# Patient Record
Sex: MALE | Race: WHITE | Employment: FULL TIME | URBAN - METROPOLITAN AREA
[De-identification: names, ages, dates, MRNs, and addresses within clinical notes are randomized per-mention and may not be internally consistent; named-entity substitution may affect disease eponyms.]

---

## 2020-10-30 ENCOUNTER — HOSPITAL ENCOUNTER (EMERGENCY)
Facility: CLINIC | Age: 30
Discharge: HOME | End: 2020-10-31
Attending: PHYSICIAN ASSISTANT | Admitting: PHYSICIAN ASSISTANT
Payer: COMMERCIAL

## 2020-10-30 DIAGNOSIS — E11.9 TYPE 2 DIABETES MELLITUS (H): ICD-10-CM

## 2020-10-30 DIAGNOSIS — R03.0 ELEVATED BLOOD PRESSURE READING WITHOUT DIAGNOSIS OF HYPERTENSION: ICD-10-CM

## 2020-10-30 DIAGNOSIS — R79.89 ABNORMAL LFTS (LIVER FUNCTION TESTS): ICD-10-CM

## 2020-10-30 LAB
ALBUMIN SERPL-MCNC: 3.6 G/DL (ref 3.4–5)
ALP SERPL-CCNC: 117 U/L (ref 40–150)
ALT SERPL W P-5'-P-CCNC: 160 U/L (ref 0–70)
ANION GAP SERPL CALCULATED.3IONS-SCNC: 7 MMOL/L (ref 3–14)
AST SERPL W P-5'-P-CCNC: 76 U/L (ref 0–45)
BASOPHILS # BLD AUTO: 0.1 10E9/L (ref 0–0.2)
BASOPHILS NFR BLD AUTO: 0.8 %
BILIRUB SERPL-MCNC: 0.6 MG/DL (ref 0.2–1.3)
BUN SERPL-MCNC: 12 MG/DL (ref 7–30)
CALCIUM SERPL-MCNC: 9.2 MG/DL (ref 8.5–10.1)
CHLORIDE SERPL-SCNC: 105 MMOL/L (ref 94–109)
CO2 SERPL-SCNC: 28 MMOL/L (ref 20–32)
CREAT SERPL-MCNC: 0.78 MG/DL (ref 0.66–1.25)
DIFFERENTIAL METHOD BLD: ABNORMAL
EOSINOPHIL NFR BLD AUTO: 3 %
ERYTHROCYTE [DISTWIDTH] IN BLOOD BY AUTOMATED COUNT: 12.3 % (ref 10–15)
GFR SERPL CREATININE-BSD FRML MDRD: >90 ML/MIN/{1.73_M2}
GLUCOSE SERPL-MCNC: 238 MG/DL (ref 70–99)
HBA1C MFR BLD: 7.7 % (ref 0–5.6)
HCT VFR BLD AUTO: 39.5 % (ref 40–53)
HGB BLD-MCNC: 13.8 G/DL (ref 13.3–17.7)
IMM GRANULOCYTES # BLD: 0 10E9/L (ref 0–0.4)
IMM GRANULOCYTES NFR BLD: 0.3 %
LYMPHOCYTES # BLD AUTO: 2.6 10E9/L (ref 0.8–5.3)
LYMPHOCYTES NFR BLD AUTO: 37.2 %
MCH RBC QN AUTO: 31 PG (ref 26.5–33)
MCHC RBC AUTO-ENTMCNC: 34.9 G/DL (ref 31.5–36.5)
MCV RBC AUTO: 89 FL (ref 78–100)
MONOCYTES # BLD AUTO: 0.6 10E9/L (ref 0–1.3)
MONOCYTES NFR BLD AUTO: 9 %
NEUTROPHILS # BLD AUTO: 3.5 10E9/L (ref 1.6–8.3)
NEUTROPHILS NFR BLD AUTO: 49.7 %
NRBC # BLD AUTO: 0 10*3/UL
NRBC BLD AUTO-RTO: 0 /100
PLATELET # BLD AUTO: 185 10E9/L (ref 150–450)
POTASSIUM SERPL-SCNC: 3.4 MMOL/L (ref 3.4–5.3)
PROT SERPL-MCNC: 7.3 G/DL (ref 6.8–8.8)
RBC # BLD AUTO: 4.45 10E12/L (ref 4.4–5.9)
SODIUM SERPL-SCNC: 140 MMOL/L (ref 133–144)
TROPONIN I SERPL-MCNC: <0.015 UG/L (ref 0–0.04)
TSH SERPL DL<=0.005 MIU/L-ACNC: 2.1 MU/L (ref 0.4–4)
WBC # BLD AUTO: 7.1 10E9/L (ref 4–11)

## 2020-10-30 PROCEDURE — 99284 EMERGENCY DEPT VISIT MOD MDM: CPT | Mod: 25 | Performed by: PHYSICIAN ASSISTANT

## 2020-10-30 PROCEDURE — 83036 HEMOGLOBIN GLYCOSYLATED A1C: CPT | Performed by: PHYSICIAN ASSISTANT

## 2020-10-30 PROCEDURE — 85025 COMPLETE CBC W/AUTO DIFF WBC: CPT | Performed by: PHYSICIAN ASSISTANT

## 2020-10-30 PROCEDURE — 93005 ELECTROCARDIOGRAM TRACING: CPT | Performed by: PHYSICIAN ASSISTANT

## 2020-10-30 PROCEDURE — 84484 ASSAY OF TROPONIN QUANT: CPT | Performed by: PHYSICIAN ASSISTANT

## 2020-10-30 PROCEDURE — 99284 EMERGENCY DEPT VISIT MOD MDM: CPT | Performed by: PHYSICIAN ASSISTANT

## 2020-10-30 PROCEDURE — 93010 ELECTROCARDIOGRAM REPORT: CPT | Performed by: PHYSICIAN ASSISTANT

## 2020-10-30 PROCEDURE — 84443 ASSAY THYROID STIM HORMONE: CPT | Performed by: PHYSICIAN ASSISTANT

## 2020-10-30 PROCEDURE — 80053 COMPREHEN METABOLIC PANEL: CPT | Performed by: PHYSICIAN ASSISTANT

## 2020-10-30 PROCEDURE — 250N000013 HC RX MED GY IP 250 OP 250 PS 637: Performed by: PHYSICIAN ASSISTANT

## 2020-10-30 RX ADMIN — METFORMIN HYDROCHLORIDE 500 MG: 500 TABLET ORAL at 23:58

## 2020-10-30 NOTE — LETTER
October 31, 2020      To Whom It May Concern:      Carlos Waller was seen in our Emergency Department today, 10/31/20.  I expect his condition to improve over the next couple days.  Please excuse him from work on 10/31/2020 due to his medical condition.        Sincerely,            Juvneal Beasley PA-C

## 2020-10-30 NOTE — ED AVS SNAPSHOT
[facility]  [address] [place] [address]  Phone: [phone]  Fax: [phone]                                    Carlos Waller   MRN: [id number]    Department: [facility]   Date of Visit: 10/30/2020           After Visit Summary Signature Page    I have received my discharge instructions, and my questions have been answered. I have discussed any challenges I see with this plan with the nurse or doctor.    ..........................................................................................................................................  Patient/Patient Representative Signature      ..........................................................................................................................................  Patient Representative Print Name and Relationship to Patient    ..................................................               ................................................  Date                                   Time    ..........................................................................................................................................  Reviewed by Signature/Title    ...................................................              ..............................................  Date                                               Time          22EPIC Rev 08/18

## 2020-10-31 VITALS
HEART RATE: 86 BPM | OXYGEN SATURATION: 94 % | SYSTOLIC BLOOD PRESSURE: 127 MMHG | TEMPERATURE: 98 F | RESPIRATION RATE: 18 BRPM | DIASTOLIC BLOOD PRESSURE: 89 MMHG

## 2020-10-31 NOTE — DISCHARGE INSTRUCTIONS
It was a pleasure working with you today!  I hope your condition improves rapidly!     As we discussed, you do have type 2 diabetes.  Given your active symptoms, I would recommend that we start a medication called Metformin right away.  Take this twice daily with food.  Please also start to watch the amount of carbohydrates that you consume.  Eat a good diet balanced in fruit, vegetables, and protein sources.  Try and get aerobic exercise 5-6 times per day.  This will help with weight management as well.  Please check your blood sugar 2-3 times per day before meals.  You can also check 2 hours after meals to see how certain meals affected your blood sugar.    Please call the clinic Monday morning to schedule a visit with the primary care clinic to establish care and follow-up regarding her diabetes.  They will set you up with a diabetes educator which will be a great experience to learn a lot more about diet, exercise, and blood sugar management.

## 2020-10-31 NOTE — ED TRIAGE NOTES
Patient states that been having htn and falling sleep all the time no matter how much he has slept.

## 2020-10-31 NOTE — ED PROVIDER NOTES
History     Chief Complaint   Patient presents with     Hypertension     HPI  Carlos Waller is a 29 year old male who presents for evaluation of 3 weeks of symptoms.  Has noted that his blood pressure has been high.  Does not have documented numbers.  Reports waking up every morning with a headache which is a by frontal aching sensation.  Generally gets better throughout the day.  Notes significant polydipsia and polyuria.  Fatigue noted and states that he is sleeping a lot.  He will fall asleep even when driving.  2 months ago he noticed that he gained about 15-20 pounds of uncertain region.  Now in the past month, he feels like he has lost 10-15 pounds without any change in his eating or exercise habits.  Admits to drinking 1-2 Yasmin Mist type pops per day.  Denies any stimulant use.  No street drug use.  He is working from home currently.  Does not commute.  Notes asthma as a child, but no ongoing symptoms as an adult.  Peptic ulcer disease in the past, but no active symptoms.  He was a previous smoker but now has switched to vaping.  States it is the equivalent of about 1/2 pack/day.  Patient is unaware of his family history as he grew up in foster homes and does not know his biological parents.  Denies any chest pain, palpitations, dyspnea, cough, abdominal pain, lower extremity edema/calf pain, or skin rashes.        Allergies:  No Known Allergies    Problem List:    There are no active problems to display for this patient.       Past Medical History:    No past medical history on file.    Past Surgical History:    No past surgical history on file.    Family History:    No family history on file.    Social History:  Marital Status:   [2]  Social History     Tobacco Use     Smoking status: Not on file   Substance Use Topics     Alcohol use: Not on file     Drug use: Not on file        Medications:         blood glucose (NO BRAND SPECIFIED) lancets standard       blood glucose (NO BRAND SPECIFIED) test  strip       blood glucose monitoring (NO BRAND SPECIFIED) meter device kit       metFORMIN (GLUCOPHAGE) 500 MG tablet          Review of Systems   All other systems reviewed and are negative.      Physical Exam   BP: (!) 153/100  Pulse: 89  Temp: 98  F (36.7  C)  Resp: 18  SpO2: 98 %      Physical Exam  Vitals signs and nursing note reviewed.   Constitutional:       General: He is not in acute distress.     Appearance: He is not diaphoretic.   HENT:      Head: Normocephalic and atraumatic.      Right Ear: Tympanic membrane, ear canal and external ear normal.      Left Ear: Tympanic membrane, ear canal and external ear normal.      Nose: Nose normal.      Mouth/Throat:      Mouth: Mucous membranes are moist.      Pharynx: No oropharyngeal exudate.   Eyes:      General: No scleral icterus.        Right eye: No discharge.         Left eye: No discharge.      Conjunctiva/sclera: Conjunctivae normal.      Pupils: Pupils are equal, round, and reactive to light.   Neck:      Musculoskeletal: Normal range of motion and neck supple.      Thyroid: No thyromegaly.   Cardiovascular:      Rate and Rhythm: Normal rate and regular rhythm.      Heart sounds: Normal heart sounds. No murmur.   Pulmonary:      Effort: Pulmonary effort is normal. No respiratory distress.      Breath sounds: Normal breath sounds. No wheezing or rales.   Chest:      Chest wall: No tenderness.   Abdominal:      General: Bowel sounds are normal. There is no distension.      Palpations: Abdomen is soft. There is no mass.      Tenderness: There is no abdominal tenderness. There is no right CVA tenderness, left CVA tenderness, guarding or rebound.   Musculoskeletal: Normal range of motion.         General: No swelling, tenderness or deformity.      Right lower leg: No edema.      Left lower leg: No edema.   Lymphadenopathy:      Cervical: No cervical adenopathy.   Skin:     General: Skin is warm and dry.      Capillary Refill: Capillary refill takes less than  2 seconds.      Coloration: Skin is not jaundiced or pale.      Findings: No bruising, erythema, lesion or rash.   Neurological:      General: No focal deficit present.      Mental Status: He is alert and oriented to person, place, and time.      Cranial Nerves: No cranial nerve deficit.      Motor: No weakness.   Psychiatric:         Mood and Affect: Mood normal.         Behavior: Behavior normal.         Thought Content: Thought content normal.         ED Course        Procedures               EKG Interpretation:      Interpreted by Juvenal Beasley PA-C  Time reviewed: 2300  Symptoms at time of EKG: fatigue.   Rhythm: normal sinus   Rate: normal  Axis: normal  Ectopy: none  Conduction: normal  ST Segments/ T Waves: No ST-T wave changes  Q Waves: none  Comparison to prior: No old EKG available    Clinical Impression: normal EKG          Critical Care time:  none               Results for orders placed or performed during the hospital encounter of 10/30/20 (from the past 24 hour(s))   CBC with platelets differential   Result Value Ref Range    WBC 7.1 4.0 - 11.0 10e9/L    RBC Count 4.45 4.4 - 5.9 10e12/L    Hemoglobin 13.8 13.3 - 17.7 g/dL    Hematocrit 39.5 (L) 40.0 - 53.0 %    MCV 89 78 - 100 fl    MCH 31.0 26.5 - 33.0 pg    MCHC 34.9 31.5 - 36.5 g/dL    RDW 12.3 10.0 - 15.0 %    Platelet Count 185 150 - 450 10e9/L    Diff Method Automated Method     % Neutrophils 49.7 %    % Lymphocytes 37.2 %    % Monocytes 9.0 %    % Eosinophils 3.0 %    % Basophils 0.8 %    % Immature Granulocytes 0.3 %    Nucleated RBCs 0 0 /100    Absolute Neutrophil 3.5 1.6 - 8.3 10e9/L    Absolute Lymphocytes 2.6 0.8 - 5.3 10e9/L    Absolute Monocytes 0.6 0.0 - 1.3 10e9/L    Absolute Basophils 0.1 0.0 - 0.2 10e9/L    Abs Immature Granulocytes 0.0 0 - 0.4 10e9/L    Absolute Nucleated RBC 0.0    Comprehensive metabolic panel   Result Value Ref Range    Sodium 140 133 - 144 mmol/L    Potassium 3.4 3.4 - 5.3 mmol/L    Chloride 105 94 -  109 mmol/L    Carbon Dioxide 28 20 - 32 mmol/L    Anion Gap 7 3 - 14 mmol/L    Glucose 238 (H) 70 - 99 mg/dL    Urea Nitrogen 12 7 - 30 mg/dL    Creatinine 0.78 0.66 - 1.25 mg/dL    GFR Estimate >90 >60 mL/min/[1.73_m2]    GFR Estimate If Black >90 >60 mL/min/[1.73_m2]    Calcium 9.2 8.5 - 10.1 mg/dL    Bilirubin Total 0.6 0.2 - 1.3 mg/dL    Albumin 3.6 3.4 - 5.0 g/dL    Protein Total 7.3 6.8 - 8.8 g/dL    Alkaline Phosphatase 117 40 - 150 U/L     (H) 0 - 70 U/L    AST 76 (H) 0 - 45 U/L   Troponin I   Result Value Ref Range    Troponin I ES <0.015 0.000 - 0.045 ug/L   TSH with free T4 reflex   Result Value Ref Range    TSH 2.10 0.40 - 4.00 mU/L   Hemoglobin A1c   Result Value Ref Range    Hemoglobin A1C 7.7 (H) 0 - 5.6 %       Medications   metFORMIN (GLUCOPHAGE) tablet 500 mg (500 mg Oral Given 10/30/20 6109)       Assessments & Plan (with Medical Decision Making)     Type 2 diabetes mellitus (H)  Elevated blood pressure reading without diagnosis of hypertension  Abnormal LFTs (liver function tests)     29 year old male presents for evaluation of 3 weeks of high blood pressure, bifrontal headaches when he wakes up in the morning, polydipsia, polyuria, fatigue, falling asleep while driving, and fluctuating weight.  On exam blood pressure 149/103, temperature 98.0, pulse 87, respiration 18, oxygen saturation 94% on room air.  Patient is in no acute distress.  Exam is completely normal.  EKG obtained and this displays no acute ST or T wave change.  IV established.  Laboratory levels with a normal CBC.  Hemoglobin stable at 13.8.  White blood cell count normal 7100.  Platelet count normal at 185,000.  Comprehensive metabolic panel with a significant elevation in his glucose up to 238.  LFTs abnormal with ALT up to 160 and AST up to 76.  Renal function normal.  Electrolytes normal.  Troponin undetectable and TSH normal at 2.10.  Hemoglobin A1c elevated at 7.7% confirming the diagnosis of type 2 diabetes.  LFTs  likely elevated due to fatty liver.  He does not have any abdominal pain and does not have hepatosplenomegaly.  Therefore, acute work-up in the ED would not be indicated.  We had a long discussion regarding type 2 diabetes.  We discussed diet, exercise, and medication management.  Given that he does have significant symptoms, I have recommended that he start Metformin right away.  We started him on 500 mg here in the ED given that all pharmacies are closed this late in the evening.  He will  a prescription at his outpatient pharmacy tomorrow.  We also prescribed blood glucose monitoring device, test strips, and lancets so that he can start monitoring his blood sugars and keeping a log.  Proper diet discussed with him.  Referred him back to the primary care clinic for follow-up visit in the next 5-6 days to recheck his condition.  Will need referral to the diabetes educator for further education and management.  Indications for ED return reviewed with the patient.  Discussed that his blood pressure was much improved by the end of this visit and was down to 127/68.  No indication for acute management with antihypertensives, and this can be left up to the judgment of his PCP in regards to ACE inhibitor management.  Patient was in agreement with this plan and was suitable for discharge.     I have reviewed the nursing notes.    I have reviewed the findings, diagnosis, plan and need for follow up with the patient.       Discharge Medication List as of 10/31/2020 12:00 AM      START taking these medications    Details   blood glucose (NO BRAND SPECIFIED) lancets standard Use to test blood sugar 2-3 times daily or as directed.Disp-90 each, [id number]      blood glucose (NO BRAND SPECIFIED) test strip Use to test blood sugar 2-3 times daily or as directed., Disp-90 strip, R-0, E-Prescribe      blood glucose monitoring (NO BRAND SPECIFIED) meter device kit Use to test blood sugar 2-3 times daily or as  directed.Disp-1 kit, [id number]      metFORMIN (GLUCOPHAGE) 500 MG tablet Take 1 tablet (500 mg) by mouth 2 times daily (with meals), Disp-60 tablet, R-0, E-Prescribe             Final diagnoses:   Type 2 diabetes mellitus (H)   Elevated blood pressure reading without diagnosis of hypertension   Abnormal LFTs (liver function tests)       Disclaimer: This note consists of symbols derived from keyboarding, dictation and/or voice recognition software. As a result, there may be errors in the script that have gone undetected. Please consider this when interpreting information found in this chart.      10/30/2020   [facility] EMERGENCY DEPT     Juvenal Beasley PA-C  10/31/20 0010

## 2020-11-03 NOTE — PROGRESS NOTES
Subjective     aCrlos Waller is a 29 year old male who presents to clinic today for the following health issues:    HPI         ED/UC Followup:    Facility:  [facility]  Date of visit: 10/30/31  Reason for visit: Hypertension   Current Status: Feeling slightly better         Assessments & Plan (with Medical Decision Making)     Type 2 diabetes mellitus (H)  Elevated blood pressure reading without diagnosis of hypertension  Abnormal LFTs (liver function tests)      29 year old male presents for evaluation of 3 weeks of high blood pressure, bifrontal headaches when he wakes up in the morning, polydipsia, polyuria, fatigue, falling asleep while driving, and fluctuating weight.  On exam blood pressure 149/103, temperature 98.0, pulse 87, respiration 18, oxygen saturation 94% on room air.  Patient is in no acute distress.  Exam is completely normal.  EKG obtained and this displays no acute ST or T wave change.  IV established.  Laboratory levels with a normal CBC.  Hemoglobin stable at 13.8.  White blood cell count normal 7100.  Platelet count normal at 185,000.  Comprehensive metabolic panel with a significant elevation in his glucose up to 238.  LFTs abnormal with ALT up to 160 and AST up to 76.  Renal function normal.  Electrolytes normal.  Troponin undetectable and TSH normal at 2.10.  Hemoglobin A1c elevated at 7.7% confirming the diagnosis of type 2 diabetes.  LFTs likely elevated due to fatty liver.  He does not have any abdominal pain and does not have hepatosplenomegaly.  Therefore, acute work-up in the ED would not be indicated.  We had a long discussion regarding type 2 diabetes.  We discussed diet, exercise, and medication management.  Given that he does have significant symptoms, I have recommended that he start Metformin right away.  We started him on 500 mg here in the ED given that all pharmacies are closed this late in the evening.  He will  a prescription at his outpatient pharmacy  "tomorrow.  We also prescribed blood glucose monitoring device, test strips, and lancets so that he can start monitoring his blood sugars and keeping a log.  Proper diet discussed with him.  Referred him back to the primary care clinic for follow-up visit in the next 5-6 days to recheck his condition.  Will need referral to the diabetes educator for further education and management.  Indications for ED return reviewed with the patient.  Discussed that his blood pressure was much improved by the end of this visit and was down to 127/68.  No indication for acute management with antihypertensives, and this can be left up to the judgment of his PCP in regards to ACE inhibitor management.  Patient was in agreement with this plan and was suitable for discharge.     Has been checking his blood sugars several times per day, the lowest he has seen is 168, highest is 345.     Diet: Was drinking soda previously but now switched to diet soda.  Eats a lot of rice (wife is  and eats rice).  He works 12-16 hours per day and eats mainly gas station food.  He is constantly under a lot of stress.  He admits diet is poor.  He doesn't watch how much he eats.   He has been getting up at least 3 times per night to urinate recently.  He says in general he doesn't sleep well, wakes up with AM headaches, feels tired throughout the day, wife notes he snores and sometimes stops breathing in his sleep.      He has been taking the Metformin without side effects thus far.  He has some minimal understanding of Diabetes and the long-term consequences.      Vision has been blurry, he needs to see ophthalmology.       Review of Systems   Constitutional, HEENT, cardiovascular, pulmonary, GI, , musculoskeletal, neuro, skin, endocrine systems are negative, except as otherwise noted.      Objective    /78   Pulse 68   Temp 98.4  F (36.9  C) (Temporal)   Resp 15   Ht 2.02 m (6' 7.53\")   Wt 139.7 kg (308 lb)   SpO2 97%   BMI 34.24 kg/m  "   Body mass index is 34.24 kg/m .  Physical Exam   GENERAL: healthy, alert and no distress  EYES: Eyes grossly normal to inspection, PERRL and conjunctivae and sclerae normal  MS: no gross musculoskeletal defects noted, no edema  SKIN: no suspicious lesions or rashes  NEURO: Normal strength and tone, mentation intact and speech normal  PSYCH: mentation appears normal, affect normal/bright  Diabetic foot exam: normal DP and PT pulses, no trophic changes or ulcerative lesions, normal sensory exam and normal monofilament exam    Results for orders placed or performed in visit on 11/04/20 (from the past 24 hour(s))   Lipid panel reflex to direct LDL Fasting   Result Value Ref Range    Cholesterol 163 <200 mg/dL    Triglycerides 246 (H) <150 mg/dL    HDL Cholesterol 35 (L) >39 mg/dL    LDL Cholesterol Calculated 79 <100 mg/dL    Non HDL Cholesterol 128 <130 mg/dL   Comprehensive metabolic panel   Result Value Ref Range    Sodium 140 133 - 144 mmol/L    Potassium 4.1 3.4 - 5.3 mmol/L    Chloride 108 94 - 109 mmol/L    Carbon Dioxide 25 20 - 32 mmol/L    Anion Gap 7 3 - 14 mmol/L    Glucose 137 (H) 70 - 99 mg/dL    Urea Nitrogen 11 7 - 30 mg/dL    Creatinine 0.66 0.66 - 1.25 mg/dL    GFR Estimate >90 >60 mL/min/[1.73_m2]    GFR Estimate If Black >90 >60 mL/min/[1.73_m2]    Calcium 9.3 8.5 - 10.1 mg/dL    Bilirubin Total 0.7 0.2 - 1.3 mg/dL    Albumin 4.0 3.4 - 5.0 g/dL    Protein Total 8.1 6.8 - 8.8 g/dL    Alkaline Phosphatase 120 40 - 150 U/L     (H) 0 - 70 U/L    AST 81 (H) 0 - 45 U/L           Assessment & Plan     Carlos was seen today for hospital f/u.    Diagnoses and all orders for this visit:    Type 2 diabetes mellitus with hyperglycemia, without long-term current use of insulin (H)  -     Lipid panel reflex to direct LDL Fasting  -     Albumin Random Urine Quantitative with Creat Ratio  -     AMBULATORY ADULT DIABETES EDUCATOR REFERRAL; Future  -     FOOT EXAM  -     EYE ADULT REFERRAL; Future  -      "Comprehensive metabolic panel  -     SLEEP EVALUATION & MANAGEMENT REFERRAL - [facility] [phone] (Age 13 and up if over 100 lbs); Future  -     blood glucose (NO BRAND SPECIFIED) test strip; Use to test blood sugar 2-3 times daily or as directed.  -     blood glucose (NO BRAND SPECIFIED) lancets standard; Use to test blood sugar 2-3 times daily or as directed.  -     Alcohol Swabs (ALCOHOL PREP) PADS; 1 Application 3 times daily  -     metFORMIN (GLUCOPHAGE) 500 MG tablet; Take 1 tablet (500 mg) by mouth 2 times daily (with meals)  -     STATIN NOT PRESCRIBED (INTENTIONAL); Please choose reason not prescribed, below  -     ACE/ARB/ARNI NOT PRESCRIBED (INTENTIONAL); Please choose reason not prescribed, below    Snoring  -     SLEEP EVALUATION & MANAGEMENT REFERRAL - [facility] [phone] (Age 13 and up if over 100 lbs); Future    Apnea  -     SLEEP EVALUATION & MANAGEMENT REFERRAL - [facility] [phone] (Age 13 and up if over 100 lbs); Future    Elevated LFTs    Screening for hyperlipidemia  -     Lipid panel reflex to direct LDL Fasting    Need for hepatitis C screening test  -     Hepatitis C Screen Reflex to HCV RNA Quant and Genotype         Tobacco Cessation:   reports that he has been smoking. He has never used smokeless tobacco.  Tobacco Cessation Action Plan: He was vaping and was weaning down on the nicotine dose but broke his vape pen and went back to cigarettes, he is going to restart vaping with the goal to completely stop inhalation of all products      BMI:   Estimated body mass index is 34.24 kg/m  as calculated from the following:    Height as of this encounter: 2.02 m (6' 7.53\").    Weight as of this encounter: 139.7 kg (308 lb).   Weight management plan: Discussed healthy diet and exercise guidelines         DM:  - A1c is not < 7% yet  - He just started Metformin, will maintain on this for now.   - " Checking Lipids and recommend treating if LDL> 70.   - His BP is well controlled on repeat.  Will continue to closely monitor and want tight control, holding on ACEI for now but have discussed with patient.   - He is already working on diet modifications, we discussed this more in depth and will refer to DM Educator for more information.   - He will continue to check blood sugars, discussed goal for AM fasting.   - He will schedule for eye exam.   - Concern for SAROJ based on witnessed apnea, snoring, AM headaches, fatigue, recommended sleep study to evaluate and treat, discussed the long-term consequences of undiagnosed and untreated sleep apnea.   - he is motivated to make changes.  He is working on becoming tobacco free again.   - Triglycerides are elevated, hopefully this will improve with improvement in diet, LFTs still slightly elevated, monitor, possibly fatty liver, if not improving with improved DM control and improved Triglycerides recommend further evaluation.  No symptoms concerning for liver abnormalities at this time.  Did screen for Hepatitis C based on current recommendations for screening. Recommend OTC Fish oil supplement, increase omega 3 fats in diet.     Return in about 4 weeks (around 12/2/2020) for Medication Re-check.      Options for treatment and follow-up care were reviewed with the patient and/or guardian. Patient and/or guardian engaged in the decision making process and verbalized understanding of the options discussed and agreed with the final plan.   Eddie Sanchez PA-C  [facility]

## 2020-11-04 ENCOUNTER — OFFICE VISIT (OUTPATIENT)
Dept: FAMILY MEDICINE | Facility: OTHER | Age: 30
End: 2020-11-04

## 2020-11-04 VITALS
HEART RATE: 68 BPM | RESPIRATION RATE: 15 BRPM | OXYGEN SATURATION: 97 % | WEIGHT: 308 LBS | SYSTOLIC BLOOD PRESSURE: 126 MMHG | TEMPERATURE: 98.4 F | DIASTOLIC BLOOD PRESSURE: 78 MMHG | HEIGHT: 78 IN | BODY MASS INDEX: 35.64 KG/M2

## 2020-11-04 DIAGNOSIS — R06.83 SNORING: ICD-10-CM

## 2020-11-04 DIAGNOSIS — R79.89 ELEVATED LFTS: ICD-10-CM

## 2020-11-04 DIAGNOSIS — Z11.59 NEED FOR HEPATITIS C SCREENING TEST: ICD-10-CM

## 2020-11-04 DIAGNOSIS — R06.81 APNEA: ICD-10-CM

## 2020-11-04 DIAGNOSIS — E11.65 TYPE 2 DIABETES MELLITUS WITH HYPERGLYCEMIA, WITHOUT LONG-TERM CURRENT USE OF INSULIN (H): Primary | ICD-10-CM

## 2020-11-04 DIAGNOSIS — Z13.220 SCREENING FOR HYPERLIPIDEMIA: ICD-10-CM

## 2020-11-04 PROBLEM — E11.9 DIABETES MELLITUS, TYPE 2 (H): Status: ACTIVE | Noted: 2020-11-04

## 2020-11-04 LAB
ALBUMIN SERPL-MCNC: 4 G/DL (ref 3.4–5)
ALP SERPL-CCNC: 120 U/L (ref 40–150)
ALT SERPL W P-5'-P-CCNC: 166 U/L (ref 0–70)
ANION GAP SERPL CALCULATED.3IONS-SCNC: 7 MMOL/L (ref 3–14)
AST SERPL W P-5'-P-CCNC: 81 U/L (ref 0–45)
BILIRUB SERPL-MCNC: 0.7 MG/DL (ref 0.2–1.3)
BUN SERPL-MCNC: 11 MG/DL (ref 7–30)
CALCIUM SERPL-MCNC: 9.3 MG/DL (ref 8.5–10.1)
CHLORIDE SERPL-SCNC: 108 MMOL/L (ref 94–109)
CHOLEST SERPL-MCNC: 163 MG/DL
CO2 SERPL-SCNC: 25 MMOL/L (ref 20–32)
CREAT SERPL-MCNC: 0.66 MG/DL (ref 0.66–1.25)
CREAT UR-MCNC: 273 MG/DL
GFR SERPL CREATININE-BSD FRML MDRD: >90 ML/MIN/{1.73_M2}
GLUCOSE SERPL-MCNC: 137 MG/DL (ref 70–99)
HDLC SERPL-MCNC: 35 MG/DL
LDLC SERPL CALC-MCNC: 79 MG/DL
MICROALBUMIN UR-MCNC: 20 MG/L
MICROALBUMIN/CREAT UR: 7.29 MG/G CR (ref 0–17)
NONHDLC SERPL-MCNC: 128 MG/DL
POTASSIUM SERPL-SCNC: 4.1 MMOL/L (ref 3.4–5.3)
PROT SERPL-MCNC: 8.1 G/DL (ref 6.8–8.8)
SODIUM SERPL-SCNC: 140 MMOL/L (ref 133–144)
TRIGL SERPL-MCNC: 246 MG/DL

## 2020-11-04 PROCEDURE — 86803 HEPATITIS C AB TEST: CPT | Performed by: PHYSICIAN ASSISTANT

## 2020-11-04 PROCEDURE — 99214 OFFICE O/P EST MOD 30 MIN: CPT | Mod: 25 | Performed by: PHYSICIAN ASSISTANT

## 2020-11-04 PROCEDURE — 99207 PR FOOT EXAM NO CHARGE: CPT | Mod: 25 | Performed by: PHYSICIAN ASSISTANT

## 2020-11-04 PROCEDURE — 82043 UR ALBUMIN QUANTITATIVE: CPT | Performed by: PHYSICIAN ASSISTANT

## 2020-11-04 PROCEDURE — 80053 COMPREHEN METABOLIC PANEL: CPT | Performed by: PHYSICIAN ASSISTANT

## 2020-11-04 PROCEDURE — 80061 LIPID PANEL: CPT | Performed by: PHYSICIAN ASSISTANT

## 2020-11-04 PROCEDURE — 36415 COLL VENOUS BLD VENIPUNCTURE: CPT | Performed by: PHYSICIAN ASSISTANT

## 2020-11-04 RX ORDER — PEN NEEDLE, DIABETIC 31 GX5/16"
1 NEEDLE, DISPOSABLE MISCELLANEOUS 3 TIMES DAILY
Qty: 100 EACH | Refills: 3 | Status: SHIPPED | OUTPATIENT
Start: 2020-11-04

## 2020-11-04 ASSESSMENT — MIFFLIN-ST. JEOR: SCORE: 2509.59

## 2020-11-04 NOTE — PATIENT INSTRUCTIONS
1. Schedule an Eye exam - they should call you to schedule with us.   2. Schedule sleep study - they will call you to schedule.   3. Diabetes educator - they will call you to schedule.   4. Continue Metformin.   5. Goal is 100-120 blood sugar in the morning fasting.     We will call with labs and make plan for next step.

## 2020-11-05 LAB — HCV AB SERPL QL IA: NONREACTIVE

## 2020-11-09 ENCOUNTER — TELEPHONE (OUTPATIENT)
Dept: FAMILY MEDICINE | Facility: OTHER | Age: 30
End: 2020-11-09

## 2020-11-09 NOTE — TELEPHONE ENCOUNTER
.Diabetes Education Scheduling Outreach #1:    Call to patient to schedule. Left message with phone number to call to schedule.    Plan for 2nd outreach attempt within 1 week.    Radha Hernandez OnCall  Diabetes and Nutrition Scheduling

## 2020-12-24 PROBLEM — Z53.9 NO SHOW: Status: ACTIVE | Noted: 2020-12-24

## 2021-04-19 ENCOUNTER — TELEPHONE (OUTPATIENT)
Dept: FAMILY MEDICINE | Facility: OTHER | Age: 31
End: 2021-04-19

## 2021-04-19 NOTE — TELEPHONE ENCOUNTER
Patient Quality Outreach Summary      Summary:    Patient is due/failing the following:   Diabetic Follow-Up Visit    Type of outreach:    Phone, spoke to patient/parent. patient declines at this time.    Questions for provider review:    None                                                                                                                    Cassandra Dee CMA       Chart routed to Care Team.